# Patient Record
Sex: MALE | Race: OTHER | NOT HISPANIC OR LATINO | Employment: FULL TIME | URBAN - METROPOLITAN AREA
[De-identification: names, ages, dates, MRNs, and addresses within clinical notes are randomized per-mention and may not be internally consistent; named-entity substitution may affect disease eponyms.]

---

## 2021-01-07 ENCOUNTER — TELEPHONE (OUTPATIENT)
Dept: SCHEDULING | Facility: IMAGING CENTER | Age: 26
End: 2021-01-07

## 2021-01-29 ENCOUNTER — TELEPHONE (OUTPATIENT)
Dept: MEDICAL GROUP | Facility: PHYSICIAN GROUP | Age: 26
End: 2021-01-29

## 2021-01-29 NOTE — TELEPHONE ENCOUNTER
Future Appointments       Provider Department Center    2/4/2021 1:00 PM Josh Birmingham M.D. [facility] [place]        NEW PATIENT VISIT PRE-VISIT PLANNING    1.  EpicCare Patient is checked in Patient Demographics?Yes    2.  Immunizations were updated in Epic using Reconcile Outside Information activity? Yes         3.  Is this appointment scheduled as a Hospital Follow-Up? No    4.  Patient is due for the following Health Maintenance Topics:   Health Maintenance Due   Topic Date Due   • IMM HPV VACCINE (2 - Male 3-dose series) 09/05/2014   • IMM DTaP/Tdap/Td Vaccine (6 - Td) 03/19/2017   • IMM INFLUENZA (1) 09/01/2020       - Patient has completed Immunizations: Pt would like to get vaccines updated during office visit  and HMR Letter faxed to:  Prior PCP with [facility], Pt does not know of other PCP.    5.  Reviewed/Updated the following with patient:       •   Preferred Pharmacy? Yes       •   Preferred Lab? Yes       •   Preferred Communication? Yes       •   Allergies? Yes       •   Medications? YES. Was Abstract Encounter opened and chart updated? YES       •   Social History? Yes       •   Family History (document living status of immediate family members and if + hx of  cancer, diabetes, hypertension, hyperlipidemia, heart attack, stroke) Yes    6.  Updated Care Team?       •   DME Company (gait device, O2, CPAP, etc.) YES, Pt does not use any DME devices       •   Other Specialists (eye doctor, derm, GYN, cardiology, endo, etc): YES, Care team updated    7.  AHA (Puls[employer]) form printed for Provider? N/A

## 2021-02-04 ENCOUNTER — OFFICE VISIT (OUTPATIENT)
Dept: MEDICAL GROUP | Facility: PHYSICIAN GROUP | Age: 26
End: 2021-02-04
Payer: COMMERCIAL

## 2021-02-04 VITALS
WEIGHT: 208 LBS | OXYGEN SATURATION: 100 % | TEMPERATURE: 97.8 F | DIASTOLIC BLOOD PRESSURE: 78 MMHG | HEART RATE: 90 BPM | BODY MASS INDEX: 32.65 KG/M2 | SYSTOLIC BLOOD PRESSURE: 116 MMHG | HEIGHT: 67 IN

## 2021-02-04 DIAGNOSIS — Z23 NEED FOR TDAP VACCINATION: ICD-10-CM

## 2021-02-04 DIAGNOSIS — Z13.228 SCREENING FOR METABOLIC DISORDER: ICD-10-CM

## 2021-02-04 DIAGNOSIS — M76.61 RIGHT ACHILLES TENDINITIS: ICD-10-CM

## 2021-02-04 DIAGNOSIS — J30.2 SEASONAL ALLERGIES: ICD-10-CM

## 2021-02-04 DIAGNOSIS — L25.3 LATEX ALLERGY, CONTACT DERMATITIS: ICD-10-CM

## 2021-02-04 PROCEDURE — 99203 OFFICE O/P NEW LOW 30 MIN: CPT | Performed by: STUDENT IN AN ORGANIZED HEALTH CARE EDUCATION/TRAINING PROGRAM

## 2021-02-04 ASSESSMENT — PATIENT HEALTH QUESTIONNAIRE - PHQ9: CLINICAL INTERPRETATION OF PHQ2 SCORE: 0

## 2021-02-04 NOTE — ASSESSMENT & PLAN NOTE
Patient has had ongoing and chronic pain/irritation of his right calf/Achilles tendon, for the past year.  He notes it began after playing basketball and straining his calf, he noted the initial soreness was in his upper calf, but he kept playing basketball and running frequently.  And over time, he developed some pain in his Achilles tendon region.  Since that time, he has continued to have some mild pain, when very active.  He notes he frequently plays basketball for 2+ hours, and runs long distances.  However, he does not notice any pain, at rest.  He has not noted any discolorations, bruising, or edema/swellings.  He has not used any medications for this.  He is done gentle stretching, but still has issues periodically.

## 2021-02-04 NOTE — ASSESSMENT & PLAN NOTE
Just minor skin irritation, when wearing gloves for work, all day.   No current issues / problems with this.

## 2021-02-04 NOTE — PROGRESS NOTES
New to Provider (and [facility])      Reason to establish: New patient to establish  Chief Complaint   Patient presents with   • Establish Care   • Pain     right ankle          HPI:   López Barnes is a 25 y.o. male.       Right Achilles tendinitis  Patient has had ongoing and chronic pain/irritation of his right calf/Achilles tendon, for the past year.  He notes it began after playing basketball and straining his calf, he noted the initial soreness was in his upper calf, but he kept playing basketball and running frequently.  And over time, he developed some pain in his Achilles tendon region.  Since that time, he has continued to have some mild pain, when very active.  He notes he frequently plays basketball for 2+ hours, and runs long distances.  However, he does not notice any pain, at rest.  He has not noted any discolorations, bruising, or edema/swellings.  He has not used any medications for this.  He is done gentle stretching, but still has issues periodically.      Also reviewed :    Latex allergy, contact dermatitis  Just minor skin irritation, when wearing gloves for work, all day.   No current issues / problems with this.      Seasonal allergies  Only notes occasional problems.  Takes OTC Zertec, if needed.   Not currently a problem.       Review of Symptoms  GEN/CONST:   Denies fever, fatigue, or significant weight change.   H/E:     Denies blurry vision or eye pain.  ENT:   Denies sore throat, ear pain, difficulty hearing,   CARDIO:   Denies chest pain, palpitations, , or edema.  RESP:   Denies shortness of breath, wheezing, or coughing.  GI:    Denies nausea, vomiting, diarrhea, constipation, or abdominal pain.   :   Denies dysuria, hematuria,  HEME:  Denies easy bruising, bleeding,   MSK:  Denies weakness, edema, joint pains      + Right Anika's tendon pain when very active.    NEURO:  Denies numbness or tingling, or focal weakness.    PSYCH:  Denies anxiety,  "depression,        Past Medical History:   Diagnosis Date   • Latex allergy 1/12/2015   • Latex allergy, contact dermatitis 3/23/2015   • Seasonal allergies 6/2/2009       Past Surgical History:   Procedure Laterality Date   • DENTAL SURGERY      wisdom teeth       Family History   Problem Relation Age of Onset   • No Known Problems Mother    • No Known Problems Father        Social History     Tobacco Use   • Smoking status: Never Smoker   • Smokeless tobacco: Never Used   Substance Use Topics   • Alcohol use: No     Alcohol/week: 0.0 oz       No current outpatient medications on file.     No current facility-administered medications for this visit.        Allergies as of 02/04/2021   • (No Known Allergies)       Physical Exam  /78   Pulse 90   Temp 36.6 °C (97.8 °F) (Temporal)   Ht 1.702 m (5' 7\")   Wt 94.3 kg (208 lb)   SpO2 100%   BMI 32.58 kg/m²   General:  Alert and oriented, No apparent distress.  Eyes: PER.  EOMI.  No scleral icterus.      Neck: Supple. No lymphadenopathy noted. Thyroid not enlarged.   Lungs: Clear to auscultation bilaterally.  No wheezes or rales.  Cardiovascular: Regular rate and rhythm.  No murmurs, rubs or gallops.  Abdomen:  Soft.  Non-distended.  Non-tender.  No rebound or guarding noted.  Extremities: No pedal edema.  Good general motion of all 4 extremities.       + Very mild increased resistance, on dorsiflexion of right ankle.     - Ankle is nontender, and not swollen.  No edema noted.    Neurological:  Motor function and sensation grossly intact and symmetrical to light gross touch.   Psychological: Appears to have normal mood and affect.      Labs: No recent labs to review.      Assessment & Plan    1. Right Achilles tendinitis  Patient history sounds as though he strained his right calf 1 year ago, but kept over using it, and developed mild but chronic right Achilles tendinitis.  He will try to reduce activities and avoid overusing it, for at least 2 weeks.  Advised " patient on gentle stretching, and mobilization.  He also notes he may go to a chiropractor see if they can mobilize his joints.   - CBC WITH DIFFERENTIAL; Future    2. Latex allergy, contact dermatitis  Patient notes it is only a problem, when working and had to wear gloves every day.  Currently not doing this, therefore not a problem at this time.-   CBC WITH DIFFERENTIAL; Future    3. Seasonal allergies  Patient notes only occasional episodes of allergy problems, and occasional use of Zyrtec OTC.  Currently not causing a problem.  - CBC WITH DIFFERENTIAL; Future    4. Screening for metabolic disorder  Patient does not have any current lab work in our system.  He is now 25, and can be screened for underlying metabolic conditions.  - Comp Metabolic Panel; Future  - Lipid Profile; Future    5. Need for Tdap vaccination  Patient was initially agreeable to getting Tdap in office, and it was ordered.  However, he left before receiving it.  Therefore, he can have it done at the pharmacy (as he is aware that he is due for this).  ... This can also be followed up, on his next routine visit.      Health Maintenance Review  influ vaccine - no / declined  Pneumo Vacc - n/a  Tetanus - due for tdap - ordered / left before getting.      Can get at pharmacy, or on next routine visit....   Shingles, Colonoscopy, PSA - n/a    Labs < 12 mo / met screen - ordered.       A computerized dictation system may have been used in this note.    Despite review, there may be some spelling or grammatical errors.    Josh Birmingham M.D.  2/4/2021

## 2021-05-10 ENCOUNTER — NURSE TRIAGE (OUTPATIENT)
Dept: HEALTH INFORMATION MANAGEMENT | Facility: OTHER | Age: 26
End: 2021-05-10

## 2021-05-10 NOTE — TELEPHONE ENCOUNTER
----- Message from Nikolas Dominguez sent at 5/10/2021 12:57 PM PDT -----  Pt req'd advice nurse-I  asked if covid related- he said yes sent him to [id number]

## 2021-12-12 ENCOUNTER — OFFICE VISIT (OUTPATIENT)
Dept: URGENT CARE | Facility: PHYSICIAN GROUP | Age: 26
End: 2021-12-12
Payer: COMMERCIAL

## 2021-12-12 VITALS
OXYGEN SATURATION: 97 % | BODY MASS INDEX: 30.77 KG/M2 | RESPIRATION RATE: 16 BRPM | HEIGHT: 68 IN | TEMPERATURE: 98.4 F | SYSTOLIC BLOOD PRESSURE: 118 MMHG | WEIGHT: 203 LBS | DIASTOLIC BLOOD PRESSURE: 62 MMHG | HEART RATE: 80 BPM

## 2021-12-12 DIAGNOSIS — R09.81 NASAL CONGESTION: ICD-10-CM

## 2021-12-12 DIAGNOSIS — J02.9 SORE THROAT: ICD-10-CM

## 2021-12-12 DIAGNOSIS — J06.9 VIRAL URI: ICD-10-CM

## 2021-12-12 PROCEDURE — 99213 OFFICE O/P EST LOW 20 MIN: CPT | Performed by: NURSE PRACTITIONER

## 2021-12-12 RX ORDER — FLUTICASONE PROPIONATE 50 MCG
1 SPRAY, SUSPENSION (ML) NASAL 2 TIMES DAILY
Qty: 16 G | Refills: 0 | Status: SHIPPED | OUTPATIENT
Start: 2021-12-12

## 2021-12-12 RX ORDER — LIDOCAINE HYDROCHLORIDE 20 MG/ML
5 SOLUTION OROPHARYNGEAL 4 TIMES DAILY PRN
Qty: 120 ML | Refills: 0 | Status: SHIPPED | OUTPATIENT
Start: 2021-12-12

## 2021-12-12 NOTE — PROGRESS NOTES
Chief Complaint   Patient presents with   • Sinus Problem     2 weeks. Stuffy nose and head tension        HISTORY OF PRESENT ILLNESS: Patient is a pleasant 26 y.o. male who presents today due to symptoms which started two weeks. Pt reports nasal congestion and a sore throat. Denies ear pressure, fever, chills, fatigue, malaise, body aches, cough, chest pain, shortness of breath, or wheezing. Denies h/o asthma/copd/CAP. No immunocompromise. Has tried OTC cold medications (decongestants) without significant relief of symptoms. No recent ABX use. No other aggravating or alleviating factors.       Patient Active Problem List    Diagnosis Date Noted   • Right Achilles tendinitis 02/04/2021   • Latex allergy, contact dermatitis 03/23/2015   • Seasonal allergies 06/02/2009       Allergies:Patient has no known allergies.    Current Outpatient Medications Ordered in Epic   Medication Sig Dispense Refill   • lidocaine (XYLOCAINE) 2 % Solution Take 5 mL by mouth 4 times a day as needed for Throat/Mouth Pain. Mix 5mL in 1/4 cup of water, swish medication and spit. 120 mL 0   • fluticasone (FLONASE) 50 MCG/ACT nasal spray Administer 1 Spray into affected nostril(S) 2 times a day. 16 g 0     No current Epic-ordered facility-administered medications on file.       Past Medical History:   Diagnosis Date   • Latex allergy 1/12/2015   • Latex allergy, contact dermatitis 3/23/2015   • Seasonal allergies 6/2/2009       Social History     Tobacco Use   • Smoking status: Never Smoker   • Smokeless tobacco: Never Used   Vaping Use   • Vaping Use: Never used   Substance Use Topics   • Alcohol use: No     Alcohol/week: 0.0 oz   • Drug use: No       Family Status   Relation Name Status   • Mo  Alive   • Fa  Alive     Family History   Problem Relation Age of Onset   • No Known Problems Mother    • No Known Problems Father        ROS:  Review of Systems   Constitutional: Negative for fever, chills, fatigue, malaise. Negative for weight  "loss.  HENT: Positive for congestion, sore throat. Negative for ear pain, nosebleeds, and neck pain.    Eyes: Negative for vision changes.   Cardiovascular: Negative for chest pain, palpitations, orthopnea and leg swelling.   Respiratory: Negative for cough, shortness of breath and wheezing.   Gastrointestinal: Negative for abdominal pain, nausea, vomiting or diarrhea.   Skin: Negative for rash, diaphoresis.     Exam:  /62 (BP Location: Left arm, Patient Position: Sitting, BP Cuff Size: Adult)   Pulse 80   Temp 36.9 °C (98.4 °F) (Temporal)   Resp 16   Ht 1.727 m (5' 8\")   Wt 92.1 kg (203 lb)   SpO2 97%   General: well-nourished, well-developed male in NAD  Head: normocephalic, atraumatic  Eyes: PERRLA, EOM within normal limits, no conjunctival injection, no scleral icterus, visual fields and acuity grossly intact.  Ears: normal shape and symmetry, no tenderness, no discharge. External canals are without any significant edema or erythema. Tympanic membranes are without any inflammation, no effusion. Gross auditory acuity is intact.  Nose: symmetrical without tenderness, mild discharge, erythema present bilateral nares. No sinus tenderness.   Mouth/Throat: reasonable hygiene, no exudates or tonsillar enlargement. Mild erythema present.   Neck: no masses, range of motion within normal limits, no tracheal deviation.  Lymph: mild cervical adenopathy. No supraclavicular adenopathy.   Neuro: alert and oriented. Cranial nerves 1-12 grossly intact.   Cardiovascular: regular rate and rhythm without murmurs, rubs, or gallops. No edema.   Pulmonary: no distress. Chest is symmetrical with respiration. No wheezes, crackles, or rhonchi.   Musculoskeletal: appropriate muscle tone, gait is stable.  Skin: warm, dry, intact, no clubbing, no cyanosis.   Psych: appropriate mood, affect, judgement.         Assessment/Plan:  1. Viral URI     2. Nasal congestion  fluticasone (FLONASE) 50 MCG/ACT nasal spray   3. Sore throat  " lidocaine (XYLOCAINE) 2 % Solution           Discussed symptoms most likely viral, self limiting illness. Did not see any evidence of a bacterial process. Discussed natural progression and sx care. Flonase and Lidocaine PRN.   Rest, increase fluids, hand and respiratory hygiene.   May take OTC medications as directed for symptom relief. DC decongestants.   Supportive care, differential diagnoses, and indications for immediate follow-up discussed with patient.   Pathogenesis of diagnosis discussed including typical length and natural progression.  Instructed to return to clinic or nearest emergency department for any change in condition, further concerns, or worsening of symptoms.  Patient states understanding of the plan of care and discharge instructions.  Instructed to make an appointment with his primary care provider in the next 3-5 days if not significantly improving and for further care.         Please note that this dictation was created using voice recognition software. I have made every reasonable attempt to correct obvious errors, but I expect that there are errors of grammar and possibly content that I did not discover before finalizing the note.  N95 and safety glasses worn for the entire visit with the patient.     ZAHRA Foster.

## 2022-07-01 ENCOUNTER — OFFICE VISIT (OUTPATIENT)
Dept: URGENT CARE | Facility: PHYSICIAN GROUP | Age: 27
End: 2022-07-01
Payer: COMMERCIAL

## 2022-07-01 ENCOUNTER — HOSPITAL ENCOUNTER (OUTPATIENT)
Dept: RADIOLOGY | Facility: MEDICAL CENTER | Age: 27
End: 2022-07-01
Attending: NURSE PRACTITIONER
Payer: COMMERCIAL

## 2022-07-01 VITALS
HEART RATE: 58 BPM | BODY MASS INDEX: 31.07 KG/M2 | OXYGEN SATURATION: 99 % | TEMPERATURE: 98.4 F | WEIGHT: 205 LBS | SYSTOLIC BLOOD PRESSURE: 118 MMHG | HEIGHT: 68 IN | DIASTOLIC BLOOD PRESSURE: 82 MMHG | RESPIRATION RATE: 16 BRPM

## 2022-07-01 DIAGNOSIS — M77.8 ENTHESOPATHY OF RIGHT FOOT: ICD-10-CM

## 2022-07-01 DIAGNOSIS — M79.671 PAIN OF RIGHT HEEL: ICD-10-CM

## 2022-07-01 PROCEDURE — 73630 X-RAY EXAM OF FOOT: CPT | Mod: RT

## 2022-07-01 PROCEDURE — 99213 OFFICE O/P EST LOW 20 MIN: CPT | Performed by: NURSE PRACTITIONER

## 2022-07-01 RX ORDER — IBUPROFEN 200 MG
600 TABLET ORAL ONCE
Status: COMPLETED | OUTPATIENT
Start: 2022-07-01 | End: 2022-07-01

## 2022-07-01 RX ORDER — NAPROXEN 500 MG/1
500 TABLET ORAL
Qty: 30 TABLET | Refills: 0 | Status: SHIPPED | OUTPATIENT
Start: 2022-07-01

## 2022-07-01 RX ADMIN — Medication 600 MG: at 12:56

## 2022-07-01 ASSESSMENT — ENCOUNTER SYMPTOMS
FOCAL WEAKNESS: 1
MYALGIAS: 0
BACK PAIN: 0
SENSORY CHANGE: 0
FALLS: 0
NECK PAIN: 0

## 2022-07-01 NOTE — PROGRESS NOTES
"Subjective:     López Barnes is a 26 y.o. male who presents for Ankle Injury (Pt states he was playing basketball 2 days ago and felt a pop on R achilles. Pain hasn't gone away.)      Ankle Injury       Pt presents for evaluation of a new problem. Jareth is a pleasant 26 year old male who presents to  today with complaints of right sided heel pain that started  2 days ago after he heard a pop while playing basketball. Pain7/10. He has been using ice. He notes he is unable to fully bear weight. He notes a history of right foot tendonitis.     Review of Systems   Musculoskeletal: Positive for joint pain. Negative for back pain, falls, myalgias and neck pain.   Neurological: Positive for focal weakness. Negative for sensory change.       PMH:   Past Medical History:   Diagnosis Date   • Latex allergy 1/12/2015   • Latex allergy, contact dermatitis 3/23/2015   • Seasonal allergies 6/2/2009     ALLERGIES: No Known Allergies  SURGHX:   Past Surgical History:   Procedure Laterality Date   • DENTAL SURGERY      wisdom teeth     SOCHX:   Social History     Socioeconomic History   • Marital status: Single   Tobacco Use   • Smoking status: Never Smoker   • Smokeless tobacco: Never Used   Vaping Use   • Vaping Use: Never used   Substance and Sexual Activity   • Alcohol use: No     Alcohol/week: 0.0 oz   • Drug use: No   • Sexual activity: Not Currently   Social History Narrative    Student. Plays football.     FH:   Family History   Problem Relation Age of Onset   • No Known Problems Mother    • No Known Problems Father          Objective:   /82 (BP Location: Left arm, Patient Position: Sitting, BP Cuff Size: Adult)   Pulse (!) 58   Temp 36.9 °C (98.4 °F) (Temporal)   Resp 16   Ht 1.727 m (5' 8\")   Wt 93 kg (205 lb) Comment: verbally given  SpO2 99%   BMI 31.17 kg/m²     Physical Exam  Vitals and nursing note reviewed.   Constitutional:       General: He is not in acute distress.     Appearance: " Normal appearance. He is normal weight. He is not ill-appearing or toxic-appearing.   HENT:      Head: Normocephalic.      Right Ear: External ear normal.      Left Ear: External ear normal.      Nose: Nose normal.      Mouth/Throat:      Mouth: Mucous membranes are moist.   Eyes:      General:         Right eye: No discharge.         Left eye: No discharge.      Extraocular Movements: Extraocular movements intact.      Conjunctiva/sclera: Conjunctivae normal.      Pupils: Pupils are equal, round, and reactive to light.   Pulmonary:      Effort: Pulmonary effort is normal.      Breath sounds: Normal breath sounds.   Abdominal:      General: Abdomen is flat.   Musculoskeletal:         General: Normal range of motion.      Cervical back: Normal range of motion and neck supple. No rigidity.      Right ankle:      Right Achilles Tendon: Tenderness present. No defects. Meraz's test negative.        Legs:       Comments: Positive for swelling of right heel.  Negative for erythema or ecchymosis.  There is pain with light palpation.   Lymphadenopathy:      Cervical: No cervical adenopathy.   Skin:     General: Skin is warm and dry.   Neurological:      General: No focal deficit present.      Mental Status: He is alert and oriented to person, place, and time. Mental status is at baseline.   Psychiatric:         Mood and Affect: Mood normal.         Behavior: Behavior normal.         Judgment: Judgment normal.       DX foot:   IMPRESSION:     1.  No radiographic evidence of acute traumatic injury.  2.  Normal appearance of the Achilles tendon shadow.  3.  There is minimal Achilles tendon enthesopathy.  Assessment/Plan:   Assessment    1. Pain of right heel  DX-FOOT-COMPLETE 3+ RIGHT    ibuprofen (MOTRIN) tablet 600 mg    Referral to Physical Therapy    naproxen (NAPROSYN) 500 MG Tab   2. Enthesopathy of right foot  Referral to Physical Therapy   Patient to follow-up with physical therapy if symptoms of right heel pain  worsen or do not improve.  Naproxen sent to pharmacy for relief of anti-inflammatory benefit.  Ace wrap applied in clinic.  Continue with rest, ice, compression and elevation.    AVS handout given and reviewed with patient. Pt educated on red flags and when to seek treatment back in ER or UC.